# Patient Record
Sex: MALE | Race: WHITE | URBAN - METROPOLITAN AREA
[De-identification: names, ages, dates, MRNs, and addresses within clinical notes are randomized per-mention and may not be internally consistent; named-entity substitution may affect disease eponyms.]

---

## 2023-09-18 ENCOUNTER — OFFICE VISIT (OUTPATIENT)
Dept: NEUROSURGERY | Age: 53
End: 2023-09-18
Payer: COMMERCIAL

## 2023-09-18 VITALS
TEMPERATURE: 97.9 F | DIASTOLIC BLOOD PRESSURE: 77 MMHG | WEIGHT: 256 LBS | SYSTOLIC BLOOD PRESSURE: 146 MMHG | OXYGEN SATURATION: 98 % | BODY MASS INDEX: 34.67 KG/M2 | HEIGHT: 72 IN | HEART RATE: 57 BPM

## 2023-09-18 DIAGNOSIS — M48.02 NEUROFORAMINAL STENOSIS OF CERVICAL SPINE: ICD-10-CM

## 2023-09-18 DIAGNOSIS — M54.2 NECK PAIN: ICD-10-CM

## 2023-09-18 DIAGNOSIS — M48.02 CERVICAL SPINAL STENOSIS: Primary | ICD-10-CM

## 2023-09-18 PROCEDURE — 99204 OFFICE O/P NEW MOD 45 MIN: CPT | Performed by: NEUROLOGICAL SURGERY

## 2023-09-18 RX ORDER — VENLAFAXINE HYDROCHLORIDE 37.5 MG/1
CAPSULE, EXTENDED RELEASE ORAL DAILY
COMMUNITY
Start: 2023-08-03

## 2023-09-18 RX ORDER — LISINOPRIL 10 MG/1
TABLET ORAL
COMMUNITY
Start: 2023-08-05

## 2023-09-18 RX ORDER — GABAPENTIN 300 MG/1
300 CAPSULE ORAL 3 TIMES DAILY
COMMUNITY
Start: 2023-08-03

## 2023-09-18 RX ORDER — ATORVASTATIN CALCIUM 80 MG/1
TABLET, FILM COATED ORAL
COMMUNITY
Start: 2023-08-03

## 2023-09-18 RX ORDER — CLOPIDOGREL BISULFATE 75 MG/1
75 TABLET ORAL DAILY
COMMUNITY
Start: 2023-08-03

## 2023-09-18 RX ORDER — PANTOPRAZOLE SODIUM 40 MG/1
40 TABLET, DELAYED RELEASE ORAL DAILY
COMMUNITY
Start: 2023-08-16

## 2023-09-18 RX ORDER — METOPROLOL SUCCINATE 50 MG/1
50 TABLET, EXTENDED RELEASE ORAL DAILY
COMMUNITY
Start: 2023-08-03

## 2023-09-18 NOTE — PROGRESS NOTES
[facility] NOTE:   History of Present Illness:    CC: Neck pain, shoulder pain intermittent numbness and tingling the arms and legs. Hilaria Guajardo is a 47 y.o. male who presents today for evaluation of neck pain, shoulder pain and intermittent numbness and tingling in the arms and legs. The patient states has been experiencing neck pain and discomfort for probably progressively over the past 3 years. He states that sometimes the neck pain will be bad enough that he will get some intense headaches which is why proceed with the MRI he denies any pain radiating into his arms or weakness in his arms. He does endorse numbness and tingling in his arms. He is a former smoker as he quit smoking in 2021 after he had a heart attack that required cardiac stent placement. He is on Plavix still because of the cardiac stents. He has not done any formal physical therapy or pain management and has been treating his pain discomfort with ibuprofen. He is also on gabapentin 300 mg 3 times daily. He was referred here by his primary care provider Dr. Telly Lisa. The patient has an MRI cervical spine without contrast performed at [facility] on 8/18/2023 that demonstrates mild cervical spondylosis with a broad-based disc osteophyte complex that is eccentric to the right resulting in severe neuroforaminal stenosis and moderate to advanced spinal stenosis at C5-C6 there is also advanced right greater than left bilateral neuroforaminal stenosis secondary to uncovertebral joint and facet arthropathy. At C6-C7 there is a disc osteophyte complex and ligamentum flavum thickening the results in moderate spinal canal stenosis and advanced bilateral neuroforaminal stenosis. At T1-T2 there is a left eccentric disc protrusion that contacts and contoured to the left ventral cord but results in no significant spinal stenosis as there is still CSF signal dorsal to the cord.   He currently